# Patient Record
Sex: FEMALE | Race: WHITE
[De-identification: names, ages, dates, MRNs, and addresses within clinical notes are randomized per-mention and may not be internally consistent; named-entity substitution may affect disease eponyms.]

---

## 2021-08-28 ENCOUNTER — HOSPITAL ENCOUNTER (EMERGENCY)
Dept: HOSPITAL 46 - ED | Age: 76
Discharge: HOME | End: 2021-08-28
Payer: MEDICARE

## 2021-08-28 VITALS — HEIGHT: 66 IN | WEIGHT: 220 LBS | BODY MASS INDEX: 35.36 KG/M2

## 2021-08-28 DIAGNOSIS — N39.0: Primary | ICD-10-CM

## 2021-08-28 DIAGNOSIS — E78.00: ICD-10-CM

## 2021-08-28 DIAGNOSIS — Z88.5: ICD-10-CM

## 2021-08-28 DIAGNOSIS — E03.9: ICD-10-CM

## 2021-08-28 DIAGNOSIS — Z88.2: ICD-10-CM

## 2021-08-28 DIAGNOSIS — I10: ICD-10-CM

## 2021-08-28 DIAGNOSIS — Z79.899: ICD-10-CM

## 2021-08-28 DIAGNOSIS — Z88.8: ICD-10-CM

## 2022-01-25 ENCOUNTER — HOSPITAL ENCOUNTER (EMERGENCY)
Dept: HOSPITAL 46 - ED | Age: 77
Discharge: HOME | End: 2022-01-25
Payer: MEDICARE

## 2022-01-25 VITALS — WEIGHT: 220 LBS | HEIGHT: 66 IN | BODY MASS INDEX: 35.36 KG/M2

## 2022-01-25 DIAGNOSIS — Z88.5: ICD-10-CM

## 2022-01-25 DIAGNOSIS — E03.9: ICD-10-CM

## 2022-01-25 DIAGNOSIS — E78.00: ICD-10-CM

## 2022-01-25 DIAGNOSIS — Z79.899: ICD-10-CM

## 2022-01-25 DIAGNOSIS — Z88.8: ICD-10-CM

## 2022-01-25 DIAGNOSIS — M25.512: Primary | ICD-10-CM

## 2022-01-25 DIAGNOSIS — Z79.890: ICD-10-CM

## 2022-01-25 DIAGNOSIS — I10: ICD-10-CM

## 2022-01-25 DIAGNOSIS — Z88.2: ICD-10-CM

## 2022-01-25 NOTE — EKG
[facility]
                                    [address]
                                  [place], [place]  [address]
_________________________________________________________________________________________
                                                                 Signed   
 
 
Normal sinus rhythm
Low voltage QRS
Borderline ECG
No previous ECGs available
Confirmed by SHILPI ORTIZ DO ([id number]) on 1/25/2022 9:18:52 PM
 
 
 
 
 
 
 
 
 
 
 
 
 
 
 
 
 
 
 
 
 
 
 
 
 
 
 
 
 
 
 
 
 
 
 
 
 
 
 
 
    Electronically Signed By: SHILPI ORTIZ DO  01/25/22 2119
_________________________________________________________________________________________
PATIENT NAME:     EMIGDIOSTEVE BUCKLEY                   
MEDICAL RECORD #: [id number]                     Electrocardiogram             
          ACCT #: [id number]  
DATE OF BIRTH:   [birth year]                                       
PHYSICIAN:   SHILPI ORTIZ DO                     REPORT #: [id number]
REPORT IS CONFIDENTIAL AND NOT TO BE RELEASED WITHOUT AUTHORIZATION

## 2022-01-25 NOTE — XMS
PreManage Notification: STEVE BEAL MRN:[id number]
 
Security Information
 
Security Events
No recent Security Events currently on file
 
 
 
CRITERIA MET
------------
- [facility] - 3 Facilities in 90 Days
 
 
CARE PROVIDERS
There are no care providers on record at this time.
 
Ammy has no Care Guidelines for this patient.
 
PETER VISIT COUNT (12 MO.)
-------------------------------------------------------------------------------------
[facility]
 
2 [facility]
 
2 [facility]
-------------------------------------------------------------------------------------
TOTAL 5
-------------------------------------------------------------------------------------
NOTE: Visits indicate total known visits.
 
ED/C VISIT TRACKING (12 MO.)
-------------------------------------------------------------------------------------
01/25/2022 02:35
[facility] [place]
 
TYPE: Emergency
 
COMPLAINT:
- L SIDED SHOULDER/BACK PAIN
-------------------------------------------------------------------------------------
11/27/2021 18:27
[facility]
 
[facility]: Emergency
 
DIAGNOSES:
- vag bleeding
- Asymptomatic microscopic hematuria
- Hematuria
-------------------------------------------------------------------------------------
11/22/2021 09:28
[facility]
 
[facility]
 
DIAGNOSES:
- poss kidney infection
- Hematuria, unspecified
 
- Hematuria
-------------------------------------------------------------------------------------
11/09/2021 11:26
[facility]         [place]
 
TYPE: Emergency
 
DIAGNOSES:
- Allergy status to penicillin
- Allergy status to other antibiotic agents
- Type 2 diabetes mellitus without complications
-------------------------------------------------------------------------------------
08/28/2021 08:15
[facility]
 
TYPE: Emergency
 
COMPLAINT:
- URINE PROBLEM
 
DIAGNOSES:
- Hypothyroidism, unspecified
- Allergy status to narcotic agent
- Urinary tract infection, site not specified
- Pure hypercholesterolemia, unspecified
- Other long term (current) drug therapy
- Allergy status to sulfonamides
- Dysuria
- Essential (primary) hypertension
- Allergy status to other drugs, medicaments and biological substances
-------------------------------------------------------------------------------------
 
 
INPATIENT VISIT TRACKING (12 MO.)
No inpatient visits to display in this time frame
 
[email or web address]
I have reviewed the history and physical – recorded 1 to 30 days prior the procedure – and I have re-examined the patient (so as to update any changes in the patient's health status), and I state that it is still appropriate with my corrections and/or amendments as documented.     Patient reports no interval changes to overall health status or medical history since our previous encounter.      I have had a lengthy conversation with the patient and have discussed my impressions and treatment plans with the patient.  Detailed informed consent and potential risks , benefits and alternatives to the planned surgery were once again reinforced and reviewed, including but not limited to:  bleeding, infection, esophageal/gastric/intestinal or other visceral or solid organ injury; leak, sepsis, death, reflux, inability to perform the intended procedure, inability to lose desired amount of weight, regain of weight; port site or incisional hernia, need for other procedure or re-operation, perioperative myocardial infarction, stroke, deep vein thrombosis, pulmonary embolus, pneumonia and death.  All surgical options were discussed including non-surgical treatments. The patient remains interested in a sleeve gastrectomy for weight loss.     The weight loss surgery education packet as well as the nutrition education packet have been reviewed and given to the patient previously, and I also provided patient with detailed information regarding the post-operative instructions and expectations.  The patient is aware of the expected immediate post operative course, length of stay and discharge plan for the sleeve gastrectomy procedure.   The patient had the opportunity to ask pertinent questions, and all of patient’s questions and concerns were addressed to patient’s satisfaction.  Patient verbalized an understanding of the information discussed, and wishes to proceed with surgery. Informed consent signed.

## 2022-03-30 ENCOUNTER — HOSPITAL ENCOUNTER (EMERGENCY)
Dept: HOSPITAL 46 - ED | Age: 77
Discharge: HOME | End: 2022-03-30
Payer: MEDICARE

## 2022-03-30 VITALS — BODY MASS INDEX: 35.36 KG/M2 | HEIGHT: 66 IN | WEIGHT: 220 LBS

## 2022-03-30 DIAGNOSIS — Z98.890: ICD-10-CM

## 2022-03-30 DIAGNOSIS — I10: ICD-10-CM

## 2022-03-30 DIAGNOSIS — E78.00: ICD-10-CM

## 2022-03-30 DIAGNOSIS — R09.89: Primary | ICD-10-CM

## 2022-03-30 DIAGNOSIS — Z79.899: ICD-10-CM

## 2022-03-30 DIAGNOSIS — E03.9: ICD-10-CM

## 2022-03-30 DIAGNOSIS — Z88.2: ICD-10-CM

## 2022-03-30 DIAGNOSIS — Z88.8: ICD-10-CM

## 2022-03-30 DIAGNOSIS — Z88.5: ICD-10-CM

## 2022-03-30 DIAGNOSIS — T40.425A: ICD-10-CM

## 2022-03-30 NOTE — XMS
PreManage Notification: STEVE BEAL MRN:[id number]
 
Security Information
 
Security Events
No recent Security Events currently on file
 
 
 
CRITERIA MET
------------
- 6 ED Visits in 6 Months
- [facility] - 2 Visits in 30 Days
 
 
CARE PROVIDERS
There are no care providers on record at this time.
 
Ammy has no Care Guidelines for this patient.
 
PETER VISIT COUNT (12 MO.)
-------------------------------------------------------------------------------------
1 [facility]
 
4 [facility]
 
3 [facility] [facility]
-------------------------------------------------------------------------------------
TOTAL 8
-------------------------------------------------------------------------------------
NOTE: Visits indicate total known visits.
 
ED/C VISIT TRACKING (12 MO.)
-------------------------------------------------------------------------------------
03/30/2022 19:08
[facility] [place]
 
TYPE: Emergency
 
COMPLAINT:
- MEDICATION REACTION
-------------------------------------------------------------------------------------
03/14/2022 10:06
[facility]
 
[facility]
 
DIAGNOSES:
- abd pain, pressure
- Abdominal Pain
- Unspecified abdominal pain
-------------------------------------------------------------------------------------
02/04/2022 09:39
[facility]
 
[facility]
 
DIAGNOSES:
- Shoulder Pain
 
- Pain in left shoulder
- LT shoulder pain
-------------------------------------------------------------------------------------
01/25/2022 02:35
[facility]
 
TYPE: Emergency
 
COMPLAINT:
- L SIDED SHOULDER/BACK PAIN
 
DIAGNOSES:
- Other long term (current) drug therapy
- Allergy status to narcotic agent
- Hypothyroidism, unspecified
- Pain in left shoulder
- Pure hypercholesterolemia, unspecified
- Hormone replacement therapy
- Allergy status to sulfonamides
- Allergy status to other drugs, medicaments and biological substances
- Pain in left arm
- Essential (primary) hypertension
-------------------------------------------------------------------------------------
11/27/2021 18:27
[facility]
 
[facility]: Emergency
 
DIAGNOSES:
- vag bleeding
- Asymptomatic microscopic hematuria
- Hematuria
-------------------------------------------------------------------------------------
11/22/2021 09:28
[facility]
 
[facility] Emergency
 
DIAGNOSES:
- poss kidney infection
- Hematuria, unspecified
- Hematuria
-------------------------------------------------------------------------------------
11/09/2021 11:26
[facility]
[place]
 
TYPE: Emergency
 
DIAGNOSES:
- Allergy status to penicillin
- Allergy status to other antibiotic agents
- Type 2 diabetes mellitus without complications
-------------------------------------------------------------------------------------
08/28/2021 08:15
[facility] [place]
 
TYPE: Emergency
 
COMPLAINT:
 
- URINE PROBLEM
 
DIAGNOSES:
- Hypothyroidism, unspecified
- Allergy status to narcotic agent
- Urinary tract infection, site not specified
- Pure hypercholesterolemia, unspecified
- Other long term (current) drug therapy
- Allergy status to sulfonamides
- Dysuria
- Essential (primary) hypertension
- Allergy status to other drugs, medicaments and biological substances
-------------------------------------------------------------------------------------
 
 
INPATIENT VISIT TRACKING (12 MO.)
No inpatient visits to display in this time frame
 
[email or web address]

## 2022-04-01 ENCOUNTER — HOSPITAL ENCOUNTER (EMERGENCY)
Dept: HOSPITAL 46 - ED | Age: 77
Discharge: HOME | End: 2022-04-01
Payer: MEDICARE

## 2022-04-01 VITALS — HEIGHT: 66 IN | WEIGHT: 220 LBS | BODY MASS INDEX: 35.36 KG/M2

## 2022-04-01 DIAGNOSIS — E78.00: ICD-10-CM

## 2022-04-01 DIAGNOSIS — I10: ICD-10-CM

## 2022-04-01 DIAGNOSIS — Z88.8: ICD-10-CM

## 2022-04-01 DIAGNOSIS — Z88.5: ICD-10-CM

## 2022-04-01 DIAGNOSIS — Z79.899: ICD-10-CM

## 2022-04-01 DIAGNOSIS — N39.0: Primary | ICD-10-CM

## 2022-04-01 DIAGNOSIS — E03.9: ICD-10-CM

## 2022-04-01 DIAGNOSIS — Z88.2: ICD-10-CM

## 2022-04-01 NOTE — XMS
PreManage Notification: STEVE BEAL MRN:[id number]
 
Security Information
 
Security Events
No recent Security Events currently on file
 
 
 
CRITERIA MET
------------
- [facility]
- [facility] - 2 Visits in 30 Days
- 6 ED Visits in 6 Months
 
 
CARE PROVIDERS
There are no care providers on record at this time.
 
Ammy has no Care Guidelines for this patient.
 
EVandana[facility]andana VISIT COUNT (12 MO.)
-------------------------------------------------------------------------------------
[facility]
 
[address] [place] [address]
 
4 [facility]
-------------------------------------------------------------------------------------
TOTAL 9
-------------------------------------------------------------------------------------
NOTE: Visits indicate total known visits.
 
ED/UCC VISIT TRACKING (12 MO.)
-------------------------------------------------------------------------------------
04/01/2022 10:08
[facility]
 
TYPE: Emergency
 
COMPLAINT:
- LOWER L ABDOMINAL PAIN
-------------------------------------------------------------------------------------
03/30/2022 19:08
[facility]
 
TYPE: Emergency
 
COMPLAINT:
- MEDICATION REACTION
-------------------------------------------------------------------------------------
03/14/2022 10:06
[facility]
 
TYPE: Emergency
 
DIAGNOSES:
- abd pain, pressure
- Abdominal Pain
 
- Unspecified abdominal pain
-------------------------------------------------------------------------------------
02/04/2022 09:39
[facility]
 
TYPE: Emergency
 
DIAGNOSES:
- Shoulder Pain
- Pain in left shoulder
- LT shoulder pain
-------------------------------------------------------------------------------------
01/25/2022 02:35
[facility]
 
TYPE: Emergency
 
COMPLAINT:
- L SIDED SHOULDER/BACK PAIN
 
DIAGNOSES:
- Other long term (current) drug therapy
- Allergy status to narcotic agent
- Hypothyroidism, unspecified
- Pain in left shoulder
- Pure hypercholesterolemia, unspecified
- Hormone replacement therapy
- Allergy status to sulfonamides
- Allergy status to other drugs, medicaments and biological substances
- Pain in left arm
- Essential (primary) hypertension
-------------------------------------------------------------------------------------
11/27/2021 18:[facility]
 
[facility]
 
DIAGNOSES:
- vag bleeding
- Asymptomatic microscopic hematuria
- Hematuria
-------------------------------------------------------------------------------------
11/22/2021 09:28
[facility]     [facility] AMES
 
TYPE: Emergency
 
DIAGNOSES:
- poss kidney infection
- Hematuria, unspecified
- Hematuria
-------------------------------------------------------------------------------------
11/09/2021 11:26
[facility]         [place]
 
TYPE: Emergency
 
DIAGNOSES:
- Allergy status to penicillin
 
- Allergy status to other antibiotic agents
- Type 2 diabetes mellitus without complications
-------------------------------------------------------------------------------------
08/28/2021 08:15
[facility]
 
TYPE: Emergency
 
COMPLAINT:
- URINE PROBLEM
 
DIAGNOSES:
- Hypothyroidism, unspecified
- Allergy status to narcotic agent
- Urinary tract infection, site not specified
- Pure hypercholesterolemia, unspecified
- Other long term (current) drug therapy
- Allergy status to sulfonamides
- Dysuria
- Essential (primary) hypertension
- Allergy status to other drugs, medicaments and biological substances
-------------------------------------------------------------------------------------
 
 
INPATIENT VISIT TRACKING (12 MO.)
No inpatient visits to display in this time frame
 
[email or web address]

## 2024-12-09 NOTE — EKG
[facility]
                                    [address]
                                  [place]  [address]
_________________________________________________________________________________________
                                                                 Signed   
 
 
Normal sinus rhythm
Low voltage QRS
Borderline ECG
When compared with ECG of 25-JAN-2022 02:40,
No significant change was found
Confirmed by Stephany Maria MD ([id number]) on 12/9/2024 9:58:07 PM
 
 
 
 
 
 
 
 
 
 
 
 
 
 
 
 
 
 
 
 
 
 
 
 
 
 
 
 
 
 
 
 
 
 
 
 
 
 
 
    Electronically Signed By: STEPHANY MARIA MD  12/09/24 2158
_________________________________________________________________________________________
PATIENT NAME:     EMIGDIOSTEVE                   
MEDICAL RECORD #: [id number]                     Electrocardiogram             
          ACCT #: [id number]  
DATE OF BIRTH:   [birth year]                                       
PHYSICIAN:   STEPHANY MARIA MD                 REPORT #: [id number]
REPORT IS CONFIDENTIAL AND NOT TO BE RELEASED WITHOUT AUTHORIZATION

## 2025-01-13 NOTE — XMS
PreManage Notification: STEVE BEAL MRN:[id number]
 
Security Information
 
Security Events
No recent Security Events currently on file
 
 
 
CRITERIA MET
------------
- [facility] - 2 Visits in 30 Days
 
 
CARE PROVIDERS
There are no care providers on record at this time.
 
Ammy has no Care Guidelines for this patient.
 
PETER VISIT COUNT (12 MO.)
-------------------------------------------------------------------------------------
3 [facility] ([facility] Tineo)
 
[facility]
-------------------------------------------------------------------------------------
TOTAL 5
-------------------------------------------------------------------------------------
NOTE: Visits indicate total known visits.
 
ED/[facility] VISIT TRACKING (12 MO.)
-------------------------------------------------------------------------------------
01/13/2025 07:02
[facility]
 
[facility]
 
COMPLAINT:
- BLOOD PRESSURE PROBLEM
-------------------------------------------------------------------------------------
01/12/2025 09:13
[facility]      [facility] AMES
(Rivka Tineo)
 
TYPE: Emergency
 
DIAGNOSES:
- Procedure and treatment not carried out due to patient leaving prior to being seen
by health care provider
- hot flashes
- Sweats
-------------------------------------------------------------------------------------
12/09/2024 03:04
[facility]
 
TYPE: Emergency
 
COMPLAINT:
- NEW MED REACTION
 
 
DIAGNOSES:
- Allergy status to narcotic agent
- Allergy status to other drugs, medicaments and biological substances
- Allergy status to sulfonamides
- Essential (primary) hypertension
- Hormone replacement therapy
- Hypothyroidism, unspecified
- Long term (current) use of oral hypoglycemic drugs
- Other long term (current) drug therapy
- Pure hypercholesterolemia, unspecified
- Shortness of breath
- Type 2 diabetes mellitus without complications
-------------------------------------------------------------------------------------
09/25/2024 10:21
[facility]      iRvka AMES
([place])
 
TYPE: Emergency
 
DIAGNOSES:
- Type 2 diabetes mellitus with hyperosmolarity without nonketotic
hyperglycemic-hyperosmolar coma ([facility])
- Type 2 diabetes mellitus with hyperosmolarity without nonketotic
hyperglycemic-hyperosmolar coma ([facility])
- high blood sugar
- High Blood Sugar (Symptomatic)
-------------------------------------------------------------------------------------
04/02/2024 08:41
[facility]      Rivka AMES
(Rivka Tineo)
 
TYPE: Emergency
 
DIAGNOSES:
- Chest pain, unspecified
- Back Pain
- back pain, poss sinus inf
-------------------------------------------------------------------------------------
 
 
INPATIENT VISIT TRACKING (12 MO.)
No inpatient visits to display in this time frame
 
[email or web address]

## 2025-01-17 NOTE — EKG
[facility]
                                    [address]
                                  [place]  [address]
_________________________________________________________________________________________
                                                                 Signed   
 
 
Normal sinus rhythm
Low voltage QRS
Borderline ECG
When compared with ECG of 09-DEC-2024 03:45,
No significant change was found
Confirmed by Renee Wilkins DO ([id number]) on 1/17/2025 10:07:47 AM
 
 
 
 
 
 
 
 
 
 
 
 
 
 
 
 
 
 
 
 
 
 
 
 
 
 
 
 
 
 
 
 
 
 
 
 
 
 
 
    Electronically Signed By: RENEE WILKINS DO  01/17/25 1008
_________________________________________________________________________________________
PATIENT NAME:     STEVE BEAL                   
MEDICAL RECORD #: [id number]                     Electrocardiogram             
          ACCT #: [id number]  
DATE OF BIRTH:   [birth year]                                       
PHYSICIAN:   RENEE WILKINS DO                     REPORT #: [id number]
REPORT IS CONFIDENTIAL AND NOT TO BE RELEASED WITHOUT AUTHORIZATION